# Patient Record
Sex: FEMALE | Race: WHITE | NOT HISPANIC OR LATINO | URBAN - METROPOLITAN AREA
[De-identification: names, ages, dates, MRNs, and addresses within clinical notes are randomized per-mention and may not be internally consistent; named-entity substitution may affect disease eponyms.]

---

## 2024-03-01 ENCOUNTER — LAB (OUTPATIENT)
Dept: URBAN - METROPOLITAN AREA MEDICAL CENTER 25 | Facility: MEDICAL CENTER | Age: 54
End: 2024-03-01
Payer: COMMERCIAL

## 2024-03-01 ENCOUNTER — LAB (OUTPATIENT)
Dept: URBAN - METROPOLITAN AREA MEDICAL CENTER 25 | Facility: MEDICAL CENTER | Age: 54
End: 2024-03-01

## 2024-03-01 DIAGNOSIS — K55.20 ANGIODYSPLASIA OF CECUM: ICD-10-CM

## 2024-03-01 DIAGNOSIS — D62 ACUTE BLOOD LOSS ANEMIA: ICD-10-CM

## 2024-03-01 DIAGNOSIS — K29.50 CHRONIC GASTRITIS: ICD-10-CM

## 2024-03-01 DIAGNOSIS — K63.89 OTHER SPECIFIED DISEASES OF INTESTINE: ICD-10-CM

## 2024-03-01 DIAGNOSIS — K31.89 OTHER DISEASES OF STOMACH AND DUODENUM: ICD-10-CM

## 2024-03-01 PROCEDURE — 43235 EGD DIAGNOSTIC BRUSH WASH: CPT | Performed by: INTERNAL MEDICINE

## 2024-03-01 PROCEDURE — 45380 COLONOSCOPY AND BIOPSY: CPT | Performed by: INTERNAL MEDICINE

## 2024-03-01 PROCEDURE — 45388 COLONOSCOPY W/ABLATION: CPT | Performed by: INTERNAL MEDICINE

## 2024-03-01 PROCEDURE — 43239 EGD BIOPSY SINGLE/MULTIPLE: CPT | Performed by: INTERNAL MEDICINE

## 2024-03-11 ENCOUNTER — OV HOSPF/U (OUTPATIENT)
Dept: URBAN - METROPOLITAN AREA CLINIC 19 | Facility: CLINIC | Age: 54
End: 2024-03-11
Payer: COMMERCIAL

## 2024-03-11 VITALS — HEIGHT: 66 IN

## 2024-03-11 DIAGNOSIS — K25.9 GASTRIC ULCER WITHOUT HEMORRHAGE OR PERFORATION, UNSPECIFIED CHRONICITY: ICD-10-CM

## 2024-03-11 PROCEDURE — 99213 OFFICE O/P EST LOW 20 MIN: CPT | Performed by: INTERNAL MEDICINE

## 2024-03-11 RX ORDER — NYSTATIN 100000 [USP'U]/ML
TAKE 10 MILLILITERS BY ORAL ROUTE 4 TIMES A DAY FOR 7 DAYS SUSPENSION ORAL
Qty: 280 | Refills: 0 | COMMUNITY
Start: 1900-01-01

## 2024-03-11 RX ORDER — PANTOPRAZOLE SODIUM 40 MG/1
1 TABLET TABLET, DELAYED RELEASE ORAL ONCE A DAY
Status: ACTIVE | COMMUNITY

## 2024-03-11 NOTE — HPI-TODAY'S VISIT:
Ms. Fortune is a 54-year-old woman here for follow-up after recent hospitalization in January with black and bright red blood per rectum.  She also reported epigastric spasm-like postprandial pain that resolved.  Takes 2 nightly NSAIDs.  Prior history of EGD in 2018 for dysphagia.  No endoscopic abnormality noted for dysphagia but she underwent empiric savory dilation.  Chronic erosive gastritis were noted.  Biopsies were all negative for an infectious or inflammatory etiology.  Colonoscopy was performed in 2018 as well there was good prep to cecum for screening purposes was normal.  She was admitted with a hemoglobin of 12.5. EGD showed erosions and shallow ulcerations in the gastric antrum.  Biopsies were performed.  Duodenum appeared normal and biopsies were obtained.  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  Moderate sized hemorrhoids were noted.  Here for followup. Still has no energy. Has hip and back pain for which she is taking NSAIDs.

## 2024-03-24 ENCOUNTER — LAB (OUTPATIENT)
Dept: URBAN - METROPOLITAN AREA CLINIC 19 | Facility: CLINIC | Age: 54
End: 2024-03-24

## 2024-03-24 PROBLEM — 73481001: Status: ACTIVE | Noted: 2024-03-24

## 2024-05-06 ENCOUNTER — TELEPHONE ENCOUNTER (OUTPATIENT)
Dept: URBAN - METROPOLITAN AREA CLINIC 19 | Facility: CLINIC | Age: 54
End: 2024-05-06

## 2024-05-07 ENCOUNTER — TELEPHONE ENCOUNTER (OUTPATIENT)
Dept: URBAN - METROPOLITAN AREA CLINIC 19 | Facility: CLINIC | Age: 54
End: 2024-05-07

## 2024-05-08 LAB
ABSOLUTE BASOPHILS: 40
ABSOLUTE EOSINOPHILS: 79
ABSOLUTE LYMPHOCYTES: 1580
ABSOLUTE MONOCYTES: 427
ABSOLUTE NEUTROPHILS: 2275
BASOPHILS: 0.9
EOSINOPHILS: 1.8
FERRITIN, SERUM: 5
HEMATOCRIT: 38.5
HEMOGLOBIN: 12.5
IRON BIND.CAP.(TIBC): 391
IRON SATURATION: 6
IRON: 23
LYMPHOCYTES: 35.9
MCH: 28.4
MCHC: 32.5
MCV: 87.5
MONOCYTES: 9.7
MPV: 10.2
NEUTROPHILS: 51.7
PLATELET COUNT: 390
RDW: 13
RED BLOOD CELL COUNT: 4.4
WHITE BLOOD CELL COUNT: 4.4

## 2024-05-09 ENCOUNTER — OFFICE VISIT (OUTPATIENT)
Dept: URBAN - METROPOLITAN AREA SURGERY CENTER 31 | Facility: SURGERY CENTER | Age: 54
End: 2024-05-09

## 2024-05-14 ENCOUNTER — TELEPHONE ENCOUNTER (OUTPATIENT)
Dept: URBAN - METROPOLITAN AREA CLINIC 19 | Facility: CLINIC | Age: 54
End: 2024-05-14

## 2024-05-31 ENCOUNTER — OFFICE VISIT (OUTPATIENT)
Dept: URBAN - METROPOLITAN AREA MEDICAL CENTER 25 | Facility: MEDICAL CENTER | Age: 54
End: 2024-05-31